# Patient Record
Sex: FEMALE | Race: WHITE | NOT HISPANIC OR LATINO
[De-identification: names, ages, dates, MRNs, and addresses within clinical notes are randomized per-mention and may not be internally consistent; named-entity substitution may affect disease eponyms.]

---

## 2022-12-07 PROBLEM — Z00.00 ENCOUNTER FOR PREVENTIVE HEALTH EXAMINATION: Status: ACTIVE | Noted: 2022-12-07

## 2022-12-12 ENCOUNTER — APPOINTMENT (OUTPATIENT)
Dept: ORTHOPEDIC SURGERY | Facility: CLINIC | Age: 67
End: 2022-12-12

## 2022-12-12 VITALS — WEIGHT: 180 LBS | HEIGHT: 68 IN | BODY MASS INDEX: 27.28 KG/M2

## 2022-12-12 DIAGNOSIS — Z78.9 OTHER SPECIFIED HEALTH STATUS: ICD-10-CM

## 2022-12-12 DIAGNOSIS — E78.00 PURE HYPERCHOLESTEROLEMIA, UNSPECIFIED: ICD-10-CM

## 2022-12-12 PROCEDURE — 99204 OFFICE O/P NEW MOD 45 MIN: CPT

## 2022-12-12 NOTE — PHYSICAL EXAM
[Bending to left] : bending to left [Bending to right] : bending to right [de-identified] : Constitutional:\par - General Appearance:\par Unremarkable\par Body Habitus\par Well Developed\par Well Nourished\par Body Habitus\par No Deformities\par Well Groomed\par Ability To communicate:\par Normal\par Neurologic:\par Global sensation is intact to upper and lower extremities. See examination of Neck and/or Spine\par for exceptions.\par Orientation to Time, Place and Person is: Normal\par Mood And Affect is Normal\par Skin:\par - Head/Face, Right Upper/Lower Extremity, Left Upper/Lower Extremity: Normal\par See Examination of Neck and/or Spine for exceptions\par Cardiovascular:\par Peripheral Cardiovascular System is Normal\par Palpation of Lymph Nodes:\par Normal Palpation of lymph nodes in: Axilla, Cervical, Inguinal\par Abnormal Palpation of lymph nodes in: None  [] : non-antalgic [FreeTextEntry8] : tenderness over the lower ribs on the left side. abutment between the ribs and pelvis on the left.  [FreeTextEntry9] : elevation of the T spine with FF [de-identified] : L2-4 paresthesias on the left

## 2022-12-12 NOTE — ASSESSMENT
[FreeTextEntry1] : 68 y/o female with degenerative scoliosis and lumbar stenosis with back pain and intermittent neurogenic claudication and radiculopathy features. Discussed conservative treatments in the form of physical therapy. Potential interventional spine injections if patient is refractory to physical therapy. Continue to exhaust all conservative treatments before consideration of multi level spine fusion. \par \par Prior to appointment and during encounter with patient extensive medical records were reviewed including but not limited to, hospital records, outpatient records, imaging results, and lab data.During this appointment the patient was examined, diagnoses were discussed and explained in a face to face manner. In addition extensive time was spent reviewing aforementioned diagnostic studies. Counseling including abnormal image results, differential diagnoses, treatment options, risk and benefits, lifestyle changes, current condition, and current medications was performed. Patient's comments, questions, and concerns were addressed and patient verbalized understanding. Based on this patient's presentation at our office, which is an orthopedic spine surgeon's office, this patient inherently / intrinsically has a risk, however minute, of developing issues such as Cauda equina syndrome, bowel and bladder changes, or progression of motor or neurological deficits such as paralysis which may be permanent.  \par I, Maggie Smith, attest that this documentation has been prepared under the direction and in the presence of provider Jonas Gutiérrez MD.

## 2022-12-12 NOTE — DATA REVIEWED
[Bone Density] : bone density [Report was reviewed and noted in the chart] : The report was reviewed and noted in the chart [I independently reviewed and interpreted images and report] : I independently reviewed and interpreted images and report [FreeTextEntry1] : 3/15552 - normal  [FreeTextEntry2] : 10/14/2022 thoracic spine MRI\par Reveals there is mild scoliotic curvature. There is multi level disc degeneration with moderate to severe changes from T9-11. There is mild stenosis T9-11. no spinal cord compression. there is a hemangioma at T3. \par  [FreeTextEntry3] : 10/14/2022 lumbar spine MRI \par lumbar scoliotic deformity 35 degrees T12-L5. LL is 42 degrees.\par L1-2: moderate DDD moderate facet and mild stenosis \par L2-3: advanced DDD and moderate foraminal stenosis and moderate central stenosis \par L3-4: advanced DDD with moderate left and severe right foraminal stenosis over 3 root\par L4-5: advanced left facet arthropathy with severe left foraminal stenosis over the 4 root\par L5-S1:advanced left facet arthropathy with severe left foraminal stenosis of the 5 root

## 2022-12-12 NOTE — HISTORY OF PRESENT ILLNESS
[Lower back] : lower back [10] : 10 [5] : 5 [Dull/Aching] : dull/aching [Sharp] : sharp [Stabbing] : stabbing [Throbbing] : throbbing [Constant] : constant [Household chores] : household chores [Leisure] : leisure [Sleep] : sleep [Nothing helps with pain getting better] : Nothing helps with pain getting better [Sitting] : sitting [Standing] : standing [Walking] : walking [Bending forward] : bending forward [Extending back] : extending back [de-identified] : 12/12/2022 - 68 y/o female presenting for an initial evaluation of lumbar. Chief complaints of back pain, starting two months ago with intense pain while ambulating as well. Numbness/pain present in the lower extremities. With increased physical activity, there is more swelling on the left side of the lower back. Increased pain with FF. Buttock pain on the left side. Right sided symptoms are minimal. General health is good.  [] : no [FreeTextEntry3] : 10/2022 [FreeTextEntry5] : n/a [FreeTextEntry7] : left leg  [de-identified] : MRI LUMBAR SPINE

## 2023-01-04 ENCOUNTER — APPOINTMENT (OUTPATIENT)
Dept: ORTHOPEDIC SURGERY | Facility: CLINIC | Age: 68
End: 2023-01-04

## 2023-02-24 ENCOUNTER — APPOINTMENT (OUTPATIENT)
Dept: ORTHOPEDIC SURGERY | Facility: CLINIC | Age: 68
End: 2023-02-24
Payer: MEDICARE

## 2023-02-24 VITALS — HEIGHT: 68 IN | WEIGHT: 180 LBS | BODY MASS INDEX: 27.28 KG/M2

## 2023-02-24 PROCEDURE — 99214 OFFICE O/P EST MOD 30 MIN: CPT

## 2023-02-27 NOTE — ASSESSMENT
[FreeTextEntry1] : 66 y/o female with degenerative scoliosis and lumbar stenosis with back pain and intermittent neurogenic claudication and radiculopathy features. Patient will continue with current physical therapy routine, renewal was given and incorporate activities taught into their daily life. Patient was referred to pain management for L4-5 left DONAL vs L3-S1 MBB/RFA. I discussed with the patient that if she is refractory to conservative treatment then she may be a potential surgical candidate multi level fusion. Patient will follow up in 4 months. \par \par Prior to appointment and during encounter with patient extensive medical records were reviewed including but not limited to, hospital records, outpatient records, imaging results, and lab data.During this appointment the patient was examined, diagnoses were discussed and explained in a face to face manner. In addition extensive time was spent reviewing aforementioned diagnostic studies. Counseling including abnormal image results, differential diagnoses, treatment options, risk and benefits, lifestyle changes, current condition, and current medications was performed. Patient's comments, questions, and concerns were addressed and patient verbalized understanding. Based on this patient's presentation at our office, which is an orthopedic spine surgeon's office, this patient inherently / intrinsically has a risk, however minute, of developing issues such as Cauda equina syndrome, bowel and bladder changes, or progression of motor or neurological deficits such as paralysis which may be permanent.  \par I, Anabel Gamez, attest that this documentation has been prepared under the direction and in the presence of provider Jonas Gutiérrez MD.

## 2023-02-27 NOTE — HISTORY OF PRESENT ILLNESS
[Lower back] : lower back [10] : 10 [5] : 5 [Dull/Aching] : dull/aching [Sharp] : sharp [Stabbing] : stabbing [Throbbing] : throbbing [Constant] : constant [Household chores] : household chores [Leisure] : leisure [Sleep] : sleep [Nothing helps with pain getting better] : Nothing helps with pain getting better [Sitting] : sitting [Standing] : standing [Walking] : walking [Bending forward] : bending forward [Extending back] : extending back [de-identified] : 2/24/23: The patient is a 67 year female who presents today follow up low back. Patient has been limiting daily activities. She has not gotten authorization for physical therapy. She is awaiting appointments for PT. Patients symptoms have been similar to last visit. The only thing that provides her relief is ibuprofen. Patient is unable to wear flats because it gives her too much pain. When patient lifts her right leg her left hip locks up. Patient experiences numbness on the left thigh. \par \par Pain:    At Rest: _/10 \par With Activity:  10/10 \par Treatment/Imaging/Studies Since Last Visit:  None\par Reports Available For Review Today: n/a\par Change since last visit: \par Additional Information: None\par \par 12/12/2022 - 66 y/o female presenting for an initial evaluation of lumbar. Chief complaints of back pain, starting two months ago with intense pain while ambulating as well. Numbness/pain present in the lower extremities. With increased physical activity, there is more swelling on the left side of the lower back. Increased pain with FF. Buttock pain on the left side. Right sided symptoms are minimal. General health is good.  [] : no [FreeTextEntry3] : 10/2022 [FreeTextEntry5] : n/a [FreeTextEntry7] : left leg  [de-identified] : MRI LUMBAR SPINE

## 2023-02-27 NOTE — PHYSICAL EXAM
[Bending to left] : bending to left [Bending to right] : bending to right [de-identified] : Constitutional:\par - General Appearance:\par Unremarkable\par Body Habitus\par Well Developed\par Well Nourished\par Body Habitus\par No Deformities\par Well Groomed\par Ability To communicate:\par Normal\par Neurologic:\par Global sensation is intact to upper and lower extremities. See examination of Neck and/or Spine\par for exceptions.\par Orientation to Time, Place and Person is: Normal\par Mood And Affect is Normal\par Skin:\par - Head/Face, Right Upper/Lower Extremity, Left Upper/Lower Extremity: Normal\par See Examination of Neck and/or Spine for exceptions\par Cardiovascular:\par Peripheral Cardiovascular System is Normal\par Palpation of Lymph Nodes:\par Normal Palpation of lymph nodes in: Axilla, Cervical, Inguinal\par Abnormal Palpation of lymph nodes in: None  [] : non-antalgic [FreeTextEntry9] : elevation of the T spine with FF [de-identified] : L2-4 paresthesias on the left

## 2023-04-03 ENCOUNTER — APPOINTMENT (OUTPATIENT)
Dept: PAIN MANAGEMENT | Facility: CLINIC | Age: 68
End: 2023-04-03
Payer: MEDICARE

## 2023-04-03 VITALS — HEIGHT: 68 IN | WEIGHT: 182 LBS | BODY MASS INDEX: 27.58 KG/M2

## 2023-04-03 PROCEDURE — 99204 OFFICE O/P NEW MOD 45 MIN: CPT

## 2023-04-03 NOTE — ASSESSMENT
[FreeTextEntry1] : A discussion regarding available pain management treatment options occurred with the patient.  These included interventional, rehabilitative, pharmacological, and alternative modalities. We will proceed with the following:  \par \par Interventional treatment options:  \par - Proceed with bilateral L4-L5, L5-S1 facet joint MBB; proceed to RFA if adequate relief\par - May consider lumbar DONAL with increasing left radicular pain\par - Possible left SI joint intervention for ongoing focal upper buttock pain\par - see additional instructions below  \par \par Rehabilitative options: \par - continue physical therapy  \par - participation in active HEP was discussed and encouraged\par \par Medication based treatment options: \par - continue ibuprofen 600-800 up to TID as needed\par - Advised use of PPI with consistent NSAID use\par - see additional instructions below  \par \par Complementary treatment options:  \par - Weight management and lifestyle modifications discussed \par \par Additional treatment recommendations as follows: \par - Follow-up with Dr. Gutiérrez as directed\par - Follow up 1-2 weeks post injection for assessment of efficacy and further treatment recommendations\par \par The risks, benefits and alternatives of the proposed procedure were explained in detail with the patient. The risks outlined include but are not limited to infection, bleeding, post- dural puncture headache, nerve injury, a temporary increase in pain, failure to resolve symptoms, allergic reaction, and possible elevation of blood sugar in diabetics if using corticosteroid.  All questions were answered to patient's apparent satisfaction and he/she verbalized an understanding.\par \par Patient presents with axial lumbar pain that has not responded to 3 months of conservative therapy including physical therapy or NSAID therapy.  The pain is interfering with activities of daily living and functionality.  There is no radicular pain.  The pain is exacerbated by facet loading.  Positive Kemps maneuver which is defined by pain reproduction with extension and rotation of the lumbar spine to the affected side.  The patient has not had a vertebral fusion at the levels of the proposed treatment.  There is no unexplained neurologic deficit.  There is no history of systemic infection, unstable medical condition, bleeding tendency, or local infection.  The injection is being performed to diagnose the facet joint as the source of the individual's pain.\par \par The documentation recorded by the scribe, in my presence, accurately reflects the service I personally performed and the decisions made by me with my edits as appropriate. \par \par I, Emory Ott acting as scribe, attest that this documentation has been prepared under the direction and in the presence of Provider Jamel Aguilera DO.

## 2023-04-03 NOTE — DATA REVIEWED
[Lumbar Spine] : lumbar spine [MRI] : MRI [Thoracic Spine] : thoracic spine [Report was reviewed and noted in the chart] : The report was reviewed and noted in the chart [I reviewed the films/CD] : I reviewed the films/CD

## 2023-04-03 NOTE — PHYSICAL EXAM
[de-identified] : Constitutional:  \par - No acute distress  \par - Well developed; well nourished  \par \par Neurological:  \par - normal mood and affect  \par - alert and oriented x 3   \par \par Cardiovascular:  \par - grossly normal \par \par Lumbar Spine Exam: \par \par Inspection:\par erythema (-) \par ecchymosis (-) \par rashes (-) \par alignment: Lumbar scoliosis \par \par Palpation: \par Midline lumbar tenderness:            (-) \par midline thoracic tenderness:          (-) \par Lumbar paraspinal tenderness:  L (+) ; R (-) \par thoracic paraspinal tenderness: L (-) ; R (-) \par sciatic nerve tenderness :          L (-) ; R (-) \par SI joint tenderness:                     L (+) ; R (-) \par GTB tenderness:                        L (-);  R (-) \par \par ROM: Full range of motion with stiffness at extremes of flexion/extension\par Pain with extremes of extension\par \par Strength: \par                                    Right       Left    \par Hip Flexion:                (5/5)       (5/5) \par Quadriceps:               (5/5)       (5/5) \par Hamstrings:                (5/5)       (5/5) \par Ankle Dorsiflexion:     (5/5)       (5/5) \par EHL:                           (5/5)       (5/5) \par Ankle Plantarflexion:  (5/5)       (5/5) \par \par Special Tests: \par SLR:                            R (-) ; L (-) \par Facet loading:             R (+) ; L (+) \par REJI test:                R (-) ; L (-) \par Hamstring tightness:   R (-);  L (-) \par Krystian's Test              R (-);  L (+) \par Gaenslen's Test         R (-);  L (+) \par \par Neurologic: \par SILT throughout right lower extremity \par SILT throughout left lower extremity \par \par Reflexes normal and symmetric bilateral lower extremities \par \par Gait: \par Mildly antalgic gait \par ambulates without assistive device

## 2023-04-03 NOTE — HISTORY OF PRESENT ILLNESS
[Lower back] : lower back [10] : 10 [3] : 3 [Radiating] : radiating [Shooting] : shooting [Constant] : constant [Household chores] : household chores [Leisure] : leisure [Rest] : rest [Meds] : meds [Physical therapy] : physical therapy [Sitting] : sitting [Standing] : standing [Walking] : walking [Bending forward] : bending forward [Retired] : Work status: retired [] : no [FreeTextEntry1] : left side [FreeTextEntry6] : pressure, numbness  [FreeTextEntry7] : left hip and leg [FreeTextEntry9] : laying down, Ibuprofen

## 2023-04-12 ENCOUNTER — APPOINTMENT (OUTPATIENT)
Dept: PAIN MANAGEMENT | Facility: CLINIC | Age: 68
End: 2023-04-12

## 2023-04-12 ENCOUNTER — APPOINTMENT (OUTPATIENT)
Dept: PAIN MANAGEMENT | Facility: CLINIC | Age: 68
End: 2023-04-12
Payer: MEDICARE

## 2023-04-12 PROCEDURE — 64494 INJ PARAVERT F JNT L/S 2 LEV: CPT | Mod: 50

## 2023-04-12 PROCEDURE — J3490M: CUSTOM

## 2023-04-12 PROCEDURE — 64493 INJ PARAVERT F JNT L/S 1 LEV: CPT | Mod: 50

## 2023-04-12 NOTE — PROCEDURE
[FreeTextEntry3] : Date of Service: 04/12/2023 \par \par Account: [id number]\par \par Patient: VINCENT BENITEZ \par \par YOB: 1955\par \par Age: 68 year\par \par Surgeon:                  Jamel Aguilera DO\par \par Assistant:                None\par \par Pre-Operative Diagnosis:   Lumbar Spondylosis    \par \par Post Operative Diagnosis:  Lumbar Spondylosis\par \par Procedure:             Bilateral L4-5, L5-S1 facet block under fluoroscopic guidance.\par \par Anesthesia:            MAC\par \par This procedure was carried out using fluoroscopic guidance.  The risks and benefits of the procedure were discussed extensively with the patient.  The consent of the patient was obtained and the following procedure was performed. The patient was placed in the prone position on the fluoroscopy table and the area was prepped and draped in a sterile fashion.  A timeout was performed with all essential staff present and the site and side were verified.\par \par The lumbar vertebral bodies were identified and the fluoroscope was obliqued ipsilateral to approximately 30 degrees to reveal the appropriate anatomical view.  The junction of the superior articulate process and transverse process at the right L4 and L5 levels were identified and marked.   The skin at these target points was then localized using 1 cc of 1% Lidocaine at each injection site.  A spinal needle was then introduced and advanced to the above target points at the junction of the SAP and transverse processes until bone was contacted.\par \par Fluoroscope then focused on the right sacral ala on A/P view, and marked at this point.  The skin and subcutaneous structures were localized using 1cc of 1.0 % lidocaine.  A spinal needle was then advanced under fluoroscopic guidance until bone was contacted at the ala.  \par \par After negative aspiration for heme and CSF, an 1 cc of 0.5% Marcaine was injected at each of the injection sites.\par \par The procedure was performed in the exact same fashion on the contralateral left side at the L4, L5 and sacral ala levels.\par \par Vital signs remained normal throughout the procedure.  The patient tolerated the procedure well.  There were no immediate complications from the performed procedure.  The patient was instructed to apply ice over the injection sites for twenty minutes every two hours for the next 24 hours.\par \par Disposition:\par      1. The patient was advised to F/U in 1-2 weeks to assess the response to the injection. \par      2. They were advised to keep a pain diary to report the results of the diagnostic block at their [facility].\par      3. The patient was also instructed to contact me immediately if there were any concerns related to the procedure performed.

## 2023-04-14 ENCOUNTER — NON-APPOINTMENT (OUTPATIENT)
Age: 68
End: 2023-04-14

## 2023-04-27 ENCOUNTER — APPOINTMENT (OUTPATIENT)
Dept: PAIN MANAGEMENT | Facility: CLINIC | Age: 68
End: 2023-04-27

## 2023-05-05 ENCOUNTER — APPOINTMENT (OUTPATIENT)
Dept: ORTHOPEDIC SURGERY | Facility: CLINIC | Age: 68
End: 2023-05-05

## 2023-05-10 ENCOUNTER — APPOINTMENT (OUTPATIENT)
Dept: PAIN MANAGEMENT | Facility: CLINIC | Age: 68
End: 2023-05-10
Payer: MEDICARE

## 2023-05-10 PROCEDURE — 64494 INJ PARAVERT F JNT L/S 2 LEV: CPT | Mod: 50

## 2023-05-10 PROCEDURE — 64493 INJ PARAVERT F JNT L/S 1 LEV: CPT | Mod: 50

## 2023-05-10 PROCEDURE — J3490M: CUSTOM

## 2023-05-10 NOTE — PROCEDURE
[FreeTextEntry3] : Date of Service: 05/10/2023 \par \par Account: [id number]\par \par Patient: VINCENT BENITEZ \par \par YOB: 1955\par \par Age: 68 year\par \par Surgeon:                  Jamel Aguilera DO\par \par Assistant:                None\par \par Pre-Operative Diagnosis:   Lumbar Spondylosis    \par \par Post Operative Diagnosis:  Lumbar Spondylosis\par \par Procedure:             Bilateral L4-5, L5-S1 facet block under fluoroscopic guidance.\par \par Anesthesia:            MAC\par \par This procedure was carried out using fluoroscopic guidance.  The risks and benefits of the procedure were discussed extensively with the patient.  The consent of the patient was obtained and the following procedure was performed. The patient was placed in the prone position on the fluoroscopy table and the area was prepped and draped in a sterile fashion.  A timeout was performed with all essential staff present and the site and side were verified.\par \par The lumbar vertebral bodies were identified and the fluoroscope was obliqued ipsilateral to approximately 30 degrees to reveal the appropriate anatomical view.  The junction of the superior articulate process and transverse process at the right L4 and L5 levels were identified and marked.   The skin at these target points was then localized using 1 cc of 1% Lidocaine at each injection site.  A spinal needle was then introduced and advanced to the above target points at the junction of the SAP and transverse processes until bone was contacted.\par \par Fluoroscope then focused on the right sacral ala on A/P view, and marked at this point.  The skin and subcutaneous structures were localized using 1cc of 1.0 % lidocaine.  A spinal needle was then advanced under fluoroscopic guidance until bone was contacted at the ala.  \par \par After negative aspiration for heme and CSF, an 1 cc of 0.5% Marcaine was injected at each of the injection sites.\par \par The procedure was performed in the exact same fashion on the contralateral left side at the L4, L5 and sacral ala levels.\par \par Vital signs remained normal throughout the procedure.  The patient tolerated the procedure well.  There were no immediate complications from the performed procedure.  The patient was instructed to apply ice over the injection sites for twenty minutes every two hours for the next 24 hours.\par \par Disposition:\par      1. The patient was advised to F/U in 1-2 weeks to assess the response to the injection. \par      2. They were advised to keep a pain diary to report the results of the diagnostic block at their [facility].\par      3. The patient was also instructed to contact me immediately if there were any concerns related to the procedure performed.

## 2023-05-18 ENCOUNTER — APPOINTMENT (OUTPATIENT)
Dept: PAIN MANAGEMENT | Facility: CLINIC | Age: 68
End: 2023-05-18
Payer: MEDICARE

## 2023-05-18 VITALS — WEIGHT: 182 LBS | BODY MASS INDEX: 27.58 KG/M2 | HEIGHT: 68 IN

## 2023-05-18 DIAGNOSIS — M53.3 SACROCOCCYGEAL DISORDERS, NOT ELSEWHERE CLASSIFIED: ICD-10-CM

## 2023-05-18 PROCEDURE — 99214 OFFICE O/P EST MOD 30 MIN: CPT

## 2023-05-18 NOTE — ASSESSMENT
[FreeTextEntry1] : A discussion regarding available pain management treatment options occurred with the patient.  These included interventional, rehabilitative, pharmacological, and alternative modalities. We will proceed with the following:  \par \par Interventional treatment options:  \par - Proceed with bilateral L4-L5, L5-S1 facet joint RFA with fluoroscopic guidance \par - May consider lumbar DONAL with increasing left radicular pain\par - Possible left SI joint intervention for ongoing focal upper buttock pain\par - see additional instructions below  \par \par Rehabilitative options: \par - continue physical therapy  \par - participation in active HEP was discussed and encouraged\par \par Medication based treatment options: \par - continue ibuprofen 600-800 up to TID as needed\par - Advised use of PPI with consistent NSAID use\par - see additional instructions below  \par \par Complementary treatment options:  \par - Weight management and lifestyle modifications discussed \par \par Additional treatment recommendations as follows: \par - Follow-up with Dr. Gutiérrez as directed\par - Follow up 4 weeks post injection for assessment of efficacy and further treatment recommendations\par \par The risks, benefits and alternatives of the proposed procedure were explained in detail with the patient. The risks outlined include but are not limited to infection, bleeding, post- dural puncture headache, nerve injury, a temporary increase in pain, failure to resolve symptoms, allergic reaction, and possible elevation of blood sugar in diabetics if using corticosteroid.  All questions were answered to patient's apparent satisfaction and he/she verbalized an understanding.\par \par Patient has lumbosacral axial pain that is consistent with facet joint pathology.  A diagnostic temporary block with local anesthetic of the medial branch was performed and has resulted in at least a 50% reduction in pain for the duration of the specific local anesthetic effect.  The pain is not radicular and there is absence of nerve root compression.  There is no prior spinal fusion surgery at the level targeted.  The pain has failed to respond to three months of conservative therapy. \par \par The documentation recorded by the scribe, in my presence, accurately reflects the service I personally performed and the decisions made by me with my edits as appropriate. \par \par I, Juan Antonio BLACK, personally performed the services described in this documentation incident to Jamel Aguilera DO.

## 2023-05-18 NOTE — PHYSICAL EXAM
[de-identified] : Constitutional:  \par - No acute distress  \par - Well developed; well nourished  \par \par Neurological:  \par - normal mood and affect  \par - alert and oriented x 3   \par \par Cardiovascular:  \par - grossly normal \par \par Lumbar Spine Exam: \par \par Inspection:\par erythema (-) \par ecchymosis (-) \par rashes (-) \par alignment: Lumbar scoliosis \par \par Palpation: \par Midline lumbar tenderness:            (-) \par midline thoracic tenderness:          (-) \par Lumbar paraspinal tenderness:  L (+) ; R (-) \par thoracic paraspinal tenderness: L (-) ; R (-) \par sciatic nerve tenderness :          L (-) ; R (-) \par SI joint tenderness:                     L (+) ; R (-) \par GTB tenderness:                        L (-);  R (-) \par \par ROM: Full range of motion with stiffness at extremes of flexion/extension\par Pain with extremes of extension\par \par Strength: \par                                    Right       Left    \par Hip Flexion:                (5/5)       (5/5) \par Quadriceps:               (5/5)       (5/5) \par Hamstrings:                (5/5)       (5/5) \par Ankle Dorsiflexion:     (5/5)       (5/5) \par EHL:                           (5/5)       (5/5) \par Ankle Plantarflexion:  (5/5)       (5/5) \par \par Special Tests: \par SLR:                            R (-) ; L (-) \par Facet loading:             R (+) ; L (+) \par REJI test:                R (-) ; L (-) \par Hamstring tightness:   R (-);  L (-) \par Krystian's Test              R (-);  L (+) \par Gaenslen's Test         R (-);  L (+) \par \par Neurologic: \par SILT throughout right lower extremity \par SILT throughout left lower extremity \par \par Reflexes normal and symmetric bilateral lower extremities \par \par Gait: \par Mildly antalgic gait \par ambulates without assistive device

## 2023-05-19 ENCOUNTER — APPOINTMENT (OUTPATIENT)
Dept: PAIN MANAGEMENT | Facility: CLINIC | Age: 68
End: 2023-05-19
Payer: MEDICARE

## 2023-05-19 PROCEDURE — J3490M: CUSTOM

## 2023-05-19 PROCEDURE — 64635 DESTROY LUMB/SAC FACET JNT: CPT | Mod: 50

## 2023-05-19 PROCEDURE — 64636Z: CUSTOM | Mod: 50

## 2023-05-19 NOTE — PROCEDURE
[FreeTextEntry3] : Date of Service: 05/19/2023 \par \par Account: [id number]\par \par Patient: VINCENT BENITEZ \par \par YOB: 1955\par \par Age: 68 year\par \par Surgeon:                  Jamel Aguilera DO\par \par Assistant:                None\par \par Pre-Operative Diagnosis:    Lumbar Spondylosis    \par \par Post Operative Diagnosis:   Lumbar Spondylosis \par \par Procedure:             Bilateral L4-5, L5-S1 facet joint radiofrequency ablation with fluoroscopic guidance.\par \par Anesthesia:            MAC\par \par This procedure was carried out using fluoroscopic guidance.  The risks and benefits of the procedure were discussed extensively with the patient.  The consent of the patient was obtained and the following procedure was performed. The patient was placed in the prone position on the fluoroscopy table and the area was prepped and draped in a sterile fashion.  A timeout was performed with all essential staff present and the site and side were verified. \par \par The fluoroscope was then directed under A/P view to visualize the right L4-L5, L5-S1 facet joint levels. The fluoroscope was obliqued to approximately 30 degrees to reveal good Patrick dog anatomical view. The junction of the superior articulate process and transverse process at the targeted levels were then identified and marked. Skin and subcutaneous structures were then anesthetized with 1% Lidocaine at each of these levels.  After this, an 18-gauge 10cm radiofrequency needle with a 10mm curved active tip then advanced until the junction at the SAP and transverse process was met at each level.  Both ipsilateral oblique and lateral fluoroscopic views were obtained in order to advance the needle to the intended targets which was at the junction of the SAP and transverse process.\par \par Under A/P visualization, the right sacral ala was identified and marked.  Using a 25 gauge needle the skin and subcutaneous structures at this point were localized with approximately 3 mL of 1% Lidocaine.  After this, an 18-gauge 10cm radiofrequency needle with a 10mm curved active tip was inserted under fluoroscopic visualization until the needle made contact with the sacral ala. \par \par At all the treated levels, sensory stimulation was performed at 50 Hz not exceeding 1.0 volt and motor stimulation testing at 2 Hz not exceeding 3.0 volts.  There was no abnormal sensory or motor stimulation observed or reported by the patient most notably in the lower extremities.\par \par Each treated level was then anesthetized with approximately 1 ml of 0.25% Marcaine. After which each area was then ablated at 80 degrees centigrade for 90 seconds each. Patient felt no pain reproduction into the lower extremities during the ablation procedure.\par \par The procedure was repeated in the exact same fashion on the contralateral side at the left L4-L5, L5-S1 facet joint levels.\par \par Vital signs remained normal throughout the procedure.  The patient tolerated the procedure well.  There were no immediate complications from the performed procedure.  The patient was instructed to apply ice over the injection sites for twenty minutes every two hours for the next 24 hours.\par \par Disposition:\par      1. The patient was advised to F/U in 4 weeks to assess the response to the procedure. \par      2. The patient was also instructed to contact me immediately if there were any concerns related to the procedure performed.

## 2023-06-19 ENCOUNTER — APPOINTMENT (OUTPATIENT)
Dept: PAIN MANAGEMENT | Facility: CLINIC | Age: 68
End: 2023-06-19
Payer: MEDICARE

## 2023-06-19 VITALS — HEIGHT: 68 IN | WEIGHT: 182 LBS | BODY MASS INDEX: 27.58 KG/M2

## 2023-06-19 DIAGNOSIS — M41.50 OTHER SECONDARY SCOLIOSIS, SITE UNSPECIFIED: ICD-10-CM

## 2023-06-19 DIAGNOSIS — M48.062 SPINAL STENOSIS, LUMBAR REGION WITH NEUROGENIC CLAUDICATION: ICD-10-CM

## 2023-06-19 DIAGNOSIS — M47.816 SPONDYLOSIS W/OUT MYELOPATHY OR RADICULOPATHY, LUMBAR REGION: ICD-10-CM

## 2023-06-19 PROCEDURE — 99213 OFFICE O/P EST LOW 20 MIN: CPT

## 2023-06-19 NOTE — ASSESSMENT
[FreeTextEntry1] : A discussion regarding available pain management treatment options occurred with the patient.  These included interventional, rehabilitative, pharmacological, and alternative modalities. We will proceed with the following:  \par \par Interventional treatment options:  \par - none indicated at this time \par - would likely repeat bilateral L4-L5, L5-S1 facet joint RFA with return of severe axial low back pain\par - May consider lumbar DONAL with increasing left radicular pain\par - see additional instructions below  \par \par Rehabilitative options: \par - continue physical therapy  \par - participation in active HEP was discussed and encouraged\par \par Medication based treatment options: \par - continue ibuprofen 600-800 up to TID as needed\par - Methocarbamol 500 mg PRN for spasm qhs\par - Advised use of PPI with consistent NSAID use\par - see additional instructions below  \par \par Complementary treatment options:  \par - Weight management and lifestyle modifications discussed \par \par Additional treatment recommendations as follows: \par - Follow-up with Dr. Gutiérrez as directed\par - chronic left flank pronounced musculature likely related to degenerative scoliosis; F/U PCP \par - Follow up 3 months or PRN\par \par We have discussed the risks, benefits, and alternatives NSAID therapy including but not limited to the risk of bleeding, thrombosis, gastric mucosal irritation/ulceration, allergic reaction and kidney dysfunction; the patient verbalizes an understanding.\par \par The documentation recorded by the scribe, in my presence, accurately reflects the service I personally performed and the decisions made by me with my edits as appropriate. \par \par I, Juan Antonio BLACK, personally performed the services described in this documentation incident to Jamel Aguilera DO.

## 2023-06-19 NOTE — PHYSICAL EXAM
[de-identified] : Constitutional:  \par - No acute distress  \par - Well developed; well nourished  \par \par Neurological:  \par - normal mood and affect  \par - alert and oriented x 3   \par \par Cardiovascular:  \par - grossly normal \par \par Lumbar Spine Exam: \par \par Inspection:\par erythema (-) \par ecchymosis (-) \par rashes (-) \par alignment: Lumbar scoliosis \par \par Palpation: \par Midline lumbar tenderness:            (-) \par midline thoracic tenderness:          (-) \par Lumbar paraspinal tenderness:  L (+) ; R (-) \par thoracic paraspinal tenderness: L (+) ; R (-) pronounced musculature - chronic\par sciatic nerve tenderness :          L (-) ; R (-) \par SI joint tenderness:                     L (-) ; R (-) \par GTB tenderness:                        L (-);  R (-) \par \par ROM: Full range of motion with stiffness at extremes of flexion/extension\par Pain with extremes of extension\par \par Strength: \par                                    Right       Left    \par Hip Flexion:                (5/5)       (5/5) \par Quadriceps:               (5/5)       (5/5) \par Hamstrings:                (5/5)       (5/5) \par Ankle Dorsiflexion:     (5/5)       (5/5) \par EHL:                           (5/5)       (5/5) \par Ankle Plantarflexion:  (5/5)       (5/5) \par \par Special Tests: \par SLR:                            R (-) ; L (-) \par Facet loading:             R (-) ; L (-) \par REJI test:                R (-) ; L (-) \par Hamstring tightness:   R (-);  L (-) \par Krsytian's Test              R (-);  L (-) \par Gaenslen's Test         R (-);  L (-) \par \par Neurologic: \par SILT throughout right lower extremity \par SILT throughout left lower extremity \par \par Reflexes normal and symmetric bilateral lower extremities \par \par Gait: \par Mildly antalgic gait \par ambulates without assistive device

## 2023-08-17 ENCOUNTER — RX RENEWAL (OUTPATIENT)
Age: 68
End: 2023-08-17

## 2023-09-13 ENCOUNTER — RX RENEWAL (OUTPATIENT)
Age: 68
End: 2023-09-13

## 2023-09-13 RX ORDER — METHOCARBAMOL 500 MG/1
500 TABLET, FILM COATED ORAL AT BEDTIME
Qty: 60 | Refills: 0 | Status: ACTIVE | COMMUNITY
Start: 2023-06-19 | End: 1900-01-01

## 2024-09-20 NOTE — DATA REVIEWED
Please see previous messages about refills. He has not established care her and this was explained at last refill. Please call and see if he is following Dr Landers or staying here.    [Bone Density] : bone density [Report was reviewed and noted in the chart] : The report was reviewed and noted in the chart [I independently reviewed and interpreted images and report] : I independently reviewed and interpreted images and report [FreeTextEntry1] : 3/3/2022 - normal  [FreeTextEntry2] : 10/14/2022 thoracic spine MRI\par Reveals there is mild scoliotic curvature. There is multi level disc degeneration with moderate to severe changes from T9-11. There is mild stenosis T9-11. no spinal cord compression. there is a hemangioma at T3.  [FreeTextEntry3] : 10/14/2022 lumbar spine MRI \par lumbar scoliotic deformity 35 degrees T12-L5. LL is 42 degrees.\par L1-2: moderate DDD moderate facet and mild stenosis \par L2-3: advanced DDD and moderate foraminal stenosis and moderate central stenosis \par L3-4: advanced DDD with moderate left and severe right foraminal stenosis over 3 root\par L4-5: advanced left facet arthropathy with severe left foraminal stenosis over the 4 root\par L5-S1:advanced left facet arthropathy with severe left foraminal stenosis of the 5 root